# Patient Record
Sex: FEMALE | Race: WHITE
[De-identification: names, ages, dates, MRNs, and addresses within clinical notes are randomized per-mention and may not be internally consistent; named-entity substitution may affect disease eponyms.]

---

## 2019-04-16 ENCOUNTER — HOSPITAL ENCOUNTER (OUTPATIENT)
Dept: HOSPITAL 84 - D.OPS | Age: 64
Discharge: HOME | End: 2019-04-16
Attending: SURGERY
Payer: COMMERCIAL

## 2019-04-16 DIAGNOSIS — K21.9: Primary | ICD-10-CM

## 2019-04-16 DIAGNOSIS — K44.9: ICD-10-CM

## 2019-04-16 DIAGNOSIS — Z01.812: ICD-10-CM

## 2019-04-26 ENCOUNTER — HOSPITAL ENCOUNTER (OUTPATIENT)
Dept: HOSPITAL 84 - D.HCCARDIO | Age: 64
Discharge: HOME | End: 2019-04-26
Payer: COMMERCIAL

## 2019-04-26 DIAGNOSIS — R06.09: ICD-10-CM

## 2019-04-26 DIAGNOSIS — I20.9: Primary | ICD-10-CM

## 2019-04-30 NOTE — EC
PATIENT:LAURYN NATION              DATE OF SERVICE: 04/26/19
SEX: F                                  MEDICAL RECORD: [id number]
DATE OF BIRTH: [birth year]                        [facility]          
AGE OF PATIENT: 63                             ADMISSION DATE: 04/26/19
 
REFERRING PHYSICIAN:                               
 
INTERPRETING PHYSICIAN: YANDEL TAVERAS MD          
 
 
 
                             ECHOCARDIOGRAM REPORT
  ECHO CHARGES 4               ECHO COMPLETE                 Date: 04/26/19
 
 
 
CLINICAL DIAGNOSIS: ANGINA/ESCOTO/ABNORMAL EKG/MURMUR
 
                         ECHOCARDIOGRAPHIC MEASUREMENTS
      (adult normal given)
   AC root (d.<3.7cm) 3.2  cm   LV Septum d (<1.2 cm> 1.2  cm
      Valve Excursion 2.1  cm     LV Septum (systole) 1.8  cm
Left Atria (s.<4.0cm> 4.3  cm          LVPW d(<1.2cm) 1.2  cm
        RV (d.<2.3cm) 2.6  cm           LVPW (sytole) 1.8  cm
  LV diastole(<5.6CM) 5.2  cm       MV E-F(>70mm/sec)      cm
           LV systole 3.0  cm           LVOT Diameter 2.1  cm
       MV exc.(>10mm)      cm
Est.ejection fraction (50-75%)     %
 
   DOPPLER:
     LVIT      cm/sec A 77.0 cm/sec E 88.0  cm/sec
       LA      cm/sec      RVSP 37.1 mmHg
     LVOT 104  cm/sec   AOP1/2T      m/s
  Asc. Ao 159  cm/sec
     RVOT 72.0 cm/sec
       RA      cm/sec
         cm/sec
 AV Gradient Peak 10.1 mmHg  AV Mean 5.1  mmHg  AV Area 2.1  cm
 MV Gradient Peak 4.5  mmHg  MV Mean 1.5  mmHg  MV Area      cm
   COMMENTS: OP - HC                                      
 
 
 Cardiac Sonographer: 1               YUE Biwabik            
      Cardiologist: 3          Dr. Nina             
             TAPE# PACS           
                                       Pericardial Effusion N                        
 
 
DATE OF SERVICE:  04/26/2019
 
Adequate 2-D echo, color-flow and spectral Doppler, and M-mode.
 
Borderline LVH.  LV internal dimensions are normal.  Wall motion is normal.  EF
greater than 55%.  Aortic valve is tricuspid.  No evidence of stenosis by
Doppler interrogation.  Left atrium is dilated at 4.3 cm.  Mitral valve shows no
prolapse.  Mild MR.  Right-sided chambers are grossly normal.  Trace TR.
 
TRANSINT:[id number] Voice Confirmation ID: [id number] DOCUMENT ID: [id number]
 
 
 
ECHOCARDIOGRAM REPORT                          [id number]    LAURYN NATION,YANDEL BALLESTEROS MD          
 
 
 
Electronically Signed by YANDEL TAVERAS on 04/30/19 at 0849
 
 
 
 
 
 
 
 
 
 
 
 
 
 
 
 
 
 
 
 
 
 
 
 
 
 
 
 
 
 
 
 
 
 
 
 
 
 
 
 
CC:                                                             [id number]
DICTATION DATE: 04/29/19 1441     :     04/29/19 1527      [facility] CLI 
                                                                      04/26/19
[id number] [place] [id number]

## 2019-05-01 NOTE — ST
PATIENT:LAURYN NATION CURT                        MEDICAL RECORD: [id number]
SEX: F                                                   LOCATION:[facility]         
ORDER #:                                                 ADMISSION DATE: 04/26/19
AGE OF PATIENT: 63            
 
REFERRING PHYSICIAN:                                                             
 
INTERPRETING PHYSICIAN: ZEESHAN KU MD             

 
 
DATE OF SERVICE:  04/26/2019
 
PROCEDURE:  Nuclear stress test.
 
INDICATIONS:  Angina, shortness of breath, abnormal ECG.
 
She was exercised on standard Oswaldo protocol for 5 minutes 30 seconds achieving
greater than 85% max target heart rate response with 33 mCi of sestamibi
injected at peak stress, 11 mCi used previously for rest images.
 
FINDINGS:  Gated SPECT reveals preserved ejection fraction at 70% with good wall
motion and thickening and brightening throughout all segments.  SPECT imaging
Cardiolite was used as myocardial fusion agent.  There is homogeneous uptake
throughout all segments at rest and stress with no evidence of inducible
ischemia or previous infarction.
 
OVERALL IMPRESSION:
1.  This is a normal nuclear stress test with no evidence of inducible ischemia
or previous infarction.
2.  Gated SPECT reveals a preserved ejection fraction at 70%.  In this patient
with ongoing symptomatology, the current scan does not suggest the presence of
hemodynamically significant coronary artery disease.  Evaluate noncardiac
etiology of chest pain.
 
TRANSINT:[id number] Voice Confirmation ID: [id number] DOCUMENT ID: [id number]
 
 
                                           
                                           ZEESHAN KU MD             
 
 
 
Electronically Signed by ZEESHAN KU on 05/01/19 at 1039
 
 
 
 
 
 
CC: JAMEEL MONTGOMERY                                                [phone]
DICTATION DATE: 04/26/19 1225     :     04/27/19 0726      [facility] CLI 
                                                                      04/26/19
[id number] [place] [address]

## 2019-06-19 LAB
ANION GAP SERPL CALC-SCNC: 16.4 MMOL/L (ref 8–16)
BASOPHILS NFR BLD AUTO: 0.8 % (ref 0–2)
BUN SERPL-MCNC: 10 MG/DL (ref 7–18)
CALCIUM SERPL-MCNC: 9.2 MG/DL (ref 8.5–10.1)
CHLORIDE SERPL-SCNC: 107 MMOL/L (ref 98–107)
CO2 SERPL-SCNC: 24 MMOL/L (ref 21–32)
CREAT SERPL-MCNC: 0.7 MG/DL (ref 0.6–1.3)
EOSINOPHIL NFR BLD: 2.9 % (ref 0–7)
ERYTHROCYTE [DISTWIDTH] IN BLOOD BY AUTOMATED COUNT: 24.5 % (ref 11.5–14.5)
GLUCOSE SERPL-MCNC: 98 MG/DL (ref 74–106)
HCT VFR BLD CALC: 39.6 % (ref 36–48)
HGB BLD-MCNC: 12.4 G/DL (ref 12–16)
IMM GRANULOCYTES NFR BLD: 0.2 % (ref 0–5)
LYMPHOCYTES NFR BLD AUTO: 33.3 % (ref 15–50)
MCH RBC QN AUTO: 24.6 PG (ref 26–34)
MCHC RBC AUTO-ENTMCNC: 31.3 G/DL (ref 31–37)
MCV RBC: 78.6 FL (ref 80–100)
MONOCYTES NFR BLD: 8.6 % (ref 2–11)
NEUTROPHILS NFR BLD AUTO: 54.2 % (ref 40–80)
OSMOLALITY SERPL CALC.SUM OF ELEC: 283 MOSM/KG (ref 275–300)
PLATELET # BLD: 402 10X3/UL (ref 130–400)
PMV BLD AUTO: 10 FL (ref 7.4–10.4)
POTASSIUM SERPL-SCNC: 4.4 MMOL/L (ref 3.5–5.1)
RBC # BLD AUTO: 5.04 10X6/UL (ref 4–5.4)
SODIUM SERPL-SCNC: 143 MMOL/L (ref 136–145)
WBC # BLD AUTO: 6.5 10X3/UL (ref 4.8–10.8)

## 2019-06-20 ENCOUNTER — HOSPITAL ENCOUNTER (OUTPATIENT)
Dept: HOSPITAL 84 - D.OPS | Age: 64
LOS: 4 days | Discharge: HOME | End: 2019-06-24
Attending: SURGERY
Payer: COMMERCIAL

## 2019-06-20 VITALS — DIASTOLIC BLOOD PRESSURE: 77 MMHG | SYSTOLIC BLOOD PRESSURE: 146 MMHG

## 2019-06-20 VITALS — SYSTOLIC BLOOD PRESSURE: 152 MMHG | DIASTOLIC BLOOD PRESSURE: 73 MMHG

## 2019-06-20 VITALS — DIASTOLIC BLOOD PRESSURE: 75 MMHG | SYSTOLIC BLOOD PRESSURE: 137 MMHG

## 2019-06-20 VITALS — SYSTOLIC BLOOD PRESSURE: 163 MMHG | DIASTOLIC BLOOD PRESSURE: 71 MMHG

## 2019-06-20 VITALS — SYSTOLIC BLOOD PRESSURE: 163 MMHG | DIASTOLIC BLOOD PRESSURE: 82 MMHG

## 2019-06-20 VITALS — DIASTOLIC BLOOD PRESSURE: 62 MMHG | SYSTOLIC BLOOD PRESSURE: 148 MMHG

## 2019-06-20 VITALS — SYSTOLIC BLOOD PRESSURE: 160 MMHG | DIASTOLIC BLOOD PRESSURE: 80 MMHG

## 2019-06-20 VITALS — HEIGHT: 64 IN | BODY MASS INDEX: 35.93 KG/M2 | WEIGHT: 210.44 LBS | BODY MASS INDEX: 35.93 KG/M2

## 2019-06-20 VITALS — SYSTOLIC BLOOD PRESSURE: 156 MMHG | DIASTOLIC BLOOD PRESSURE: 79 MMHG

## 2019-06-20 VITALS — DIASTOLIC BLOOD PRESSURE: 83 MMHG | SYSTOLIC BLOOD PRESSURE: 155 MMHG

## 2019-06-20 VITALS — SYSTOLIC BLOOD PRESSURE: 148 MMHG | DIASTOLIC BLOOD PRESSURE: 62 MMHG

## 2019-06-20 VITALS — DIASTOLIC BLOOD PRESSURE: 78 MMHG | SYSTOLIC BLOOD PRESSURE: 164 MMHG

## 2019-06-20 DIAGNOSIS — Z01.812: ICD-10-CM

## 2019-06-20 DIAGNOSIS — K44.9: Primary | ICD-10-CM

## 2019-06-20 DIAGNOSIS — E66.9: ICD-10-CM

## 2019-06-20 NOTE — NUR
PATIENT RECIEVED DILAUDID BOLUS 0.4 MG FROM PCA. PATIENT UP OUT OF BED AND
YELLING ABOUT SHOULDER PAIN FROM GAS. EXPLAINED TO PATIENT THAT WALKING AND
MOVING AROUND WILL HELP WITH THE PAIN. VERBALIZED UNDERSTANDING. IV INTACT.
CALL LIGHT WITHIN REACH.

## 2019-06-20 NOTE — NUR
PATIENT STILL SITTING UP AT BEDSIDE AND STATING SHE NEEDS SOMETHING ELSE TO
HELP HER WITH PAIN. EXPLAINED TO PATIENT THAT SHE NEEDED TO RELAX AND TAKE
DEEP BREATHS TO HELP EASE THE PAIN. GAVE ATIVAN 1 MG FOR ANXIETY AND
RELAXATION. PATIENT IV INTACT. NO OTHER COMPLAINTS. SCDS OFF AT THIS TIME.
CALL LIGHT WITHIN REACH.

## 2019-06-20 NOTE — NUR
PATIENT SITTING UP ON SIDE OF THE BED MORE RELAXED AND STATES LESS PAIN. IV
INTACT. SATS 91 ON RA. EXPLAINED SHE WOULD NEED TO PUT O2 ON WHEN SHE GOES
BACK TO BED. VERBALIZED UNDERSTANDING. CALL LIGHT WITHIN REACH.

## 2019-06-20 NOTE — NUR
PATIENT IN BED WITH IV INTACT. VS STABLE. O2 ON AT 4 L NC. NO COMPLAINTS. PCA
WORKING FOR PAIN. CALL LIGHT WITHIN REACH.

## 2019-06-21 VITALS — DIASTOLIC BLOOD PRESSURE: 93 MMHG | SYSTOLIC BLOOD PRESSURE: 124 MMHG

## 2019-06-21 VITALS — DIASTOLIC BLOOD PRESSURE: 60 MMHG | SYSTOLIC BLOOD PRESSURE: 146 MMHG

## 2019-06-21 VITALS — DIASTOLIC BLOOD PRESSURE: 60 MMHG | SYSTOLIC BLOOD PRESSURE: 106 MMHG

## 2019-06-21 VITALS — SYSTOLIC BLOOD PRESSURE: 132 MMHG | DIASTOLIC BLOOD PRESSURE: 78 MMHG

## 2019-06-21 VITALS — DIASTOLIC BLOOD PRESSURE: 83 MMHG | SYSTOLIC BLOOD PRESSURE: 153 MMHG

## 2019-06-21 VITALS — DIASTOLIC BLOOD PRESSURE: 77 MMHG | SYSTOLIC BLOOD PRESSURE: 141 MMHG

## 2019-06-21 VITALS — SYSTOLIC BLOOD PRESSURE: 137 MMHG | DIASTOLIC BLOOD PRESSURE: 58 MMHG

## 2019-06-21 NOTE — NUR
PT ALERT & ORIENTED. OXYGEN SAT DIPPING DOWN TO 88-89%. ENCOURAGING PT TO
BREATH THROUGH HER NOSE, SAT COME UP TO 90-91% FOR A FEW SECONDS THEN GOES
BACK DOWN. PT SAYS THAT IT HURTS HER ABDOMEN TO TAKE DEEP BREATHS. OXYGEN
CURRENTLY AT 5L/NC. CALLED RT SHAVONNE. PT NOW ON 10L HIGH FLOW CANNULA - SAT
NOW 93-94%. WILL REASSESS AND CONTINUE TO MONITOR.

## 2019-06-22 VITALS — DIASTOLIC BLOOD PRESSURE: 63 MMHG | SYSTOLIC BLOOD PRESSURE: 127 MMHG

## 2019-06-22 VITALS — SYSTOLIC BLOOD PRESSURE: 143 MMHG | DIASTOLIC BLOOD PRESSURE: 84 MMHG

## 2019-06-22 VITALS — DIASTOLIC BLOOD PRESSURE: 76 MMHG | SYSTOLIC BLOOD PRESSURE: 140 MMHG

## 2019-06-22 VITALS — SYSTOLIC BLOOD PRESSURE: 146 MMHG | DIASTOLIC BLOOD PRESSURE: 87 MMHG

## 2019-06-22 VITALS — DIASTOLIC BLOOD PRESSURE: 79 MMHG | SYSTOLIC BLOOD PRESSURE: 139 MMHG

## 2019-06-22 VITALS — SYSTOLIC BLOOD PRESSURE: 107 MMHG | DIASTOLIC BLOOD PRESSURE: 72 MMHG

## 2019-06-22 NOTE — NUR
PT C/O ABDOMINAL PAIN 5/10. GAVE NORCO-10 1 TAB PO. PT SITTING UP CHAIR. USING
ACCESSORY MUSCLES TO BREATH. ASSISTED PT BACK TO BED. HOB 45 DEGREES.

## 2019-06-22 NOTE — NUR
PT ASKED FOR PAIN MEDICATION AGAIN ADVISED HER I JUST ADMINISTERED PRN PAIN
MEDICATION, CONTINUE WITH PT CARE

## 2019-06-22 NOTE — NUR
PT IS SITTING IN CHAIR AT BEDSIDE, STATES SHE IS HURTING THIS MORNING, WANTS
TO GO FOR A WALK, CALLED RESPIRATORY FOR OXYGEN TANK. NO OTHER NEEDS VOICED,
CONTINUE WITH PLAN OF CARE. PT STATED SHE IS HAVING A TROUBLE SWALLING HER
FOOD THIS MORNING AND IT SEEMS TO GET STUCK. WILL RELAY MESSAGE TO DOCTORS

## 2019-06-23 VITALS — DIASTOLIC BLOOD PRESSURE: 60 MMHG | SYSTOLIC BLOOD PRESSURE: 128 MMHG

## 2019-06-23 VITALS — SYSTOLIC BLOOD PRESSURE: 135 MMHG | DIASTOLIC BLOOD PRESSURE: 81 MMHG

## 2019-06-23 VITALS — SYSTOLIC BLOOD PRESSURE: 131 MMHG | DIASTOLIC BLOOD PRESSURE: 76 MMHG

## 2019-06-23 VITALS — DIASTOLIC BLOOD PRESSURE: 83 MMHG | SYSTOLIC BLOOD PRESSURE: 140 MMHG

## 2019-06-23 VITALS — DIASTOLIC BLOOD PRESSURE: 67 MMHG | SYSTOLIC BLOOD PRESSURE: 130 MMHG

## 2019-06-23 VITALS — SYSTOLIC BLOOD PRESSURE: 140 MMHG | DIASTOLIC BLOOD PRESSURE: 83 MMHG

## 2019-06-23 LAB
ANION GAP SERPL CALC-SCNC: 9.2 MMOL/L (ref 8–16)
BASOPHILS NFR BLD AUTO: 0.1 % (ref 0–2)
BUN SERPL-MCNC: 22 MG/DL (ref 7–18)
CALCIUM SERPL-MCNC: 9.2 MG/DL (ref 8.5–10.1)
CHLORIDE SERPL-SCNC: 100 MMOL/L (ref 98–107)
CO2 SERPL-SCNC: 29.2 MMOL/L (ref 21–32)
CREAT SERPL-MCNC: 0.8 MG/DL (ref 0.6–1.3)
EOSINOPHIL NFR BLD: 0.3 % (ref 0–7)
ERYTHROCYTE [DISTWIDTH] IN BLOOD BY AUTOMATED COUNT: 24 % (ref 11.5–14.5)
GLUCOSE SERPL-MCNC: 95 MG/DL (ref 74–106)
HCT VFR BLD CALC: 33 % (ref 36–48)
HGB BLD-MCNC: 10.2 G/DL (ref 12–16)
IMM GRANULOCYTES NFR BLD: 0.2 % (ref 0–5)
LYMPHOCYTES NFR BLD AUTO: 7.1 % (ref 15–50)
MCH RBC QN AUTO: 24.9 PG (ref 26–34)
MCHC RBC AUTO-ENTMCNC: 30.9 G/DL (ref 31–37)
MCV RBC: 80.5 FL (ref 80–100)
MONOCYTES NFR BLD: 7.7 % (ref 2–11)
NEUTROPHILS NFR BLD AUTO: 84.6 % (ref 40–80)
OSMOLALITY SERPL CALC.SUM OF ELEC: 270 MOSM/KG (ref 275–300)
PLATELET # BLD: 249 10X3/UL (ref 130–400)
PMV BLD AUTO: 9.7 FL (ref 7.4–10.4)
POTASSIUM SERPL-SCNC: 4.4 MMOL/L (ref 3.5–5.1)
RBC # BLD AUTO: 4.1 10X6/UL (ref 4–5.4)
SODIUM SERPL-SCNC: 134 MMOL/L (ref 136–145)
WBC # BLD AUTO: 18.9 10X3/UL (ref 4.8–10.8)

## 2019-06-23 NOTE — NUR
RAPID RESPONSE CALLED DUE TO LABOERED RESPIRATIONS AND WHEEZING. UP DRAFTS
GIVEN BY PT WITH PATIENT RESPONDING WELL. WILL CONT TO MONITOR

## 2019-06-23 NOTE — NUR
UP AMBULATING IN ROOM WITH O2 IN USE AT 4 L N/C, ABD INCISIONS INTACT WITH
STERI STRIPS, C/O BACK PAIN , SEE SHIFT ASSESSMENT CALL LIGHT IN REACH

## 2019-06-23 NOTE — NUR
PT C/O BACK PAIN 6/10. REQUESTED MUSCLE RELAXER. CALLED PHYSICIAN. GAVE
FLEXERIL 10 MG PER DR. JULIO TELEPHONE ORDER. PT SITTING UP IN CHAIR. SHORT
OF BREATH. PT AMBULATES TO BATHROOM INDEPENDENTLY. OXYGE SAT IS 90-93% ON 6L
HIGH [place] NC. NO OTHER NEEDS. WILL CONTINUE TO MONITOR.

## 2019-06-24 VITALS — SYSTOLIC BLOOD PRESSURE: 155 MMHG | DIASTOLIC BLOOD PRESSURE: 86 MMHG

## 2019-06-24 LAB
ANION GAP SERPL CALC-SCNC: 10.3 MMOL/L (ref 8–16)
BASOPHILS NFR BLD AUTO: 0.1 % (ref 0–2)
BUN SERPL-MCNC: 14 MG/DL (ref 7–18)
CALCIUM SERPL-MCNC: 8.8 MG/DL (ref 8.5–10.1)
CHLORIDE SERPL-SCNC: 100 MMOL/L (ref 98–107)
CO2 SERPL-SCNC: 28.8 MMOL/L (ref 21–32)
CREAT SERPL-MCNC: 0.6 MG/DL (ref 0.6–1.3)
EOSINOPHIL NFR BLD: 0.9 % (ref 0–7)
ERYTHROCYTE [DISTWIDTH] IN BLOOD BY AUTOMATED COUNT: 23.9 % (ref 11.5–14.5)
GLUCOSE SERPL-MCNC: 83 MG/DL (ref 74–106)
HCT VFR BLD CALC: 31.7 % (ref 36–48)
HGB BLD-MCNC: 10 G/DL (ref 12–16)
IMM GRANULOCYTES NFR BLD: 0.4 % (ref 0–5)
LYMPHOCYTES NFR BLD AUTO: 6.8 % (ref 15–50)
MCH RBC QN AUTO: 25 PG (ref 26–34)
MCHC RBC AUTO-ENTMCNC: 31.5 G/DL (ref 31–37)
MCV RBC: 79.3 FL (ref 80–100)
MONOCYTES NFR BLD: 11.9 % (ref 2–11)
NEUTROPHILS NFR BLD AUTO: 79.9 % (ref 40–80)
OSMOLALITY SERPL CALC.SUM OF ELEC: 269 MOSM/KG (ref 275–300)
PLATELET # BLD: 342 10X3/UL (ref 130–400)
PMV BLD AUTO: 10.1 FL (ref 7.4–10.4)
POTASSIUM SERPL-SCNC: 4.1 MMOL/L (ref 3.5–5.1)
RBC # BLD AUTO: 4 10X6/UL (ref 4–5.4)
SODIUM SERPL-SCNC: 135 MMOL/L (ref 136–145)
WBC # BLD AUTO: 17 10X3/UL (ref 4.8–10.8)

## 2019-06-24 NOTE — NUR
MORNING ASSESSMENT COMPLETE. SEE ASSESSMENT FLOWSHEET FOR FURHTER DETIALS. PT
LYING IN BED AAO X4 TO PERSON, PLACE, TIME, AND SITUATION. DENIES NEEDS AT
THIS TIME. CL IN REACH. SIDE RAILS UP X3 FOR PT SAFETY. BED IN LOWEST
POSITION.

## 2019-06-27 NOTE — OP
PATIENT NAME:  LAURYN NATION                        MEDICAL RECORD: [id number]
:59                                             LOCATION:[facility]          
                                                         ADMISSION DATE:        
SURGEON:  CARLOS A JULIO MD          
 
 
DATE OF OPERATION:  2019
 
PREOPERATIVE DIAGNOSES:
1.  Paraesophageal hernia.
2.  Obesity with a BMI of 36.
 
POSTOPERATIVE DIAGNOSES:
1.  Paraesophageal hernia.
2.  Obesity with a BMI of 36.
 
PROCEDURE:  Laparoscopic paraesophageal hernia repair.
 
SURGEON:  Carlos A Julio MD
 
REPORT OF PROCEDURE:  The patient's abdomen was prepped and draped in sterile
fashion.  A Veress needle was inserted in the left upper quadrant and the
abdomen was insufflated.  An 11-mm Visiport trocar was inserted in the midline
just above the umbilicus.  We could see the Veress needle and there was no sign
of any injury to bowel or surrounding structures.  An 11-mm Visiport trocar was
then inserted in the left upper quadrant, a 5 mm trocar was placed in the
epigastrium, a 5-mm trocar was placed in the left lateral abdomen and a final
5-mm trocar was placed in the right lateral subcostal region.  A liver retractor
was inserted and the left lobe of the liver was elevated.  At this point, we
could see there was a large diaphragmatic hernia with a significant portion of
the stomach extending up into the thoracic cavity.  We could pull the stomach
down and it was under tension that whenever we would release the stomach, it
would go quickly back up into the thoracic cavity.  We started our dissection on
the lesser omentum using Harmonic scalpel.  We dissected this lesser omentum off
of the lesser curvature of the stomach up to the right side of the right jagdish. 
We then scored the peritoneum on this side of the jagdish and started taking down
the hernia sac.  We extended this through the avascular plane and took down as
much of the sac as possible going as far anteriorly and posteriorly as we could
clearly see.  Once we had done this, then we started on the greater curvature of
stomach.  About prison up the stomach, we started taking down the short
gastrics using Harmonic scalpel.  We extended this dissection of the short
gastrics all the way up to the left side of the right jagdish.  We then again
scored the peritoneum and started taking down the hernia sac from the thoracic
cavity.  The hernia sac was quite large and extended about 6-7 centimeters up
into the chest.  We dissected down and removed this hernia sac and could get at
this point, a clear visualization of the esophagus.  We cleared off of all of
the tissues and extended our dissection of the esophagus up as far as we could
into the chest.  Once we had done this, then we removed the hernia sac off of
the anterior aspect of the GE junction.  The stomach would now rest easily in
the abdominal cavity including approximately 1 cm of the esophagus.  The
diaphragmatic hernia defect was then closed with interrupted 0 Polydeks times 3.
 We performed a 360-degree posterior wrap of the fundus of the stomach around
the distal esophagus using interrupted 0 Polydeks times 3 with the top and the
bottom suture incorporating a bite of the esophagus.  The wrap appeared to rest
in good position.  There was no sign of any bleeding at the conclusion of the
case.  We irrigated out the abdomen and then removed the liver retractor.  The
11-mm trocar site fascias were closed with interrupted 0 Vicryls using a
Philip-Renita suture passer device.  The ports and insufflation were then
 
 
 
OPERATIVE REPORT                               [id number]    LAURYN NATION.  The subcutaneous tissues were infused with a total of 10 mL of 0.25%
Marcaine with epinephrine and then closed with subcutaneous 5-0 Monocryl.
 
COMPLICATIONS:  None.
 
CONDITION:  Stable.
 
ANESTHESIA:  General endotracheal and local.
 
BLOOD LOSS:  Minimal.
 
TRANSINT:[id number] Voice Confirmation ID: [id number] DOCUMENT ID: [id number]
                                           
                                           CARLOS A JULIO MD          
 
 
 
Electronically Signed by CARLOS A JULIO on 19 at 0903
 
 
 
 
 
 
 
 
 
 
 
 
 
 
 
 
 
 
 
 
 
 
 
 
 
 
 
 
 
CC: JAMEEL MONTGOMERY and AMMY CHACON MD                           [phone]
DICTATION DATE: 19 1017     :     19 1032      [facility] 
                                                                      19
[facility]                                          
[address] [place] [address]